# Patient Record
Sex: MALE | Race: WHITE | Employment: OTHER | URBAN - METROPOLITAN AREA
[De-identification: names, ages, dates, MRNs, and addresses within clinical notes are randomized per-mention and may not be internally consistent; named-entity substitution may affect disease eponyms.]

---

## 2019-01-08 ENCOUNTER — ANCILLARY PROCEDURE (OUTPATIENT)
Dept: GENERAL RADIOLOGY | Facility: CLINIC | Age: 59
End: 2019-01-08
Payer: COMMERCIAL

## 2019-01-08 ENCOUNTER — OFFICE VISIT (OUTPATIENT)
Dept: ORTHOPEDICS | Facility: CLINIC | Age: 59
End: 2019-01-08
Payer: COMMERCIAL

## 2019-01-08 VITALS
WEIGHT: 244 LBS | HEIGHT: 70 IN | DIASTOLIC BLOOD PRESSURE: 76 MMHG | BODY MASS INDEX: 34.93 KG/M2 | SYSTOLIC BLOOD PRESSURE: 124 MMHG

## 2019-01-08 DIAGNOSIS — M75.101 ROTATOR CUFF SYNDROME OF RIGHT SHOULDER: ICD-10-CM

## 2019-01-08 DIAGNOSIS — M25.511 RIGHT SHOULDER PAIN, UNSPECIFIED CHRONICITY: Primary | ICD-10-CM

## 2019-01-08 DIAGNOSIS — M25.511 RIGHT SHOULDER PAIN, UNSPECIFIED CHRONICITY: ICD-10-CM

## 2019-01-08 PROCEDURE — 99203 OFFICE O/P NEW LOW 30 MIN: CPT | Performed by: PEDIATRICS

## 2019-01-08 PROCEDURE — 73030 X-RAY EXAM OF SHOULDER: CPT | Mod: RT

## 2019-01-08 ASSESSMENT — MIFFLIN-ST. JEOR: SCORE: 1928.03

## 2019-01-08 NOTE — LETTER
1/8/2019         RE: Adriano Sparks  [address]  [place] [address]        Dear Colleague,    Thank you for referring your patient, Adriano Sparks, to the [facility] [place]. Please see a copy of my visit note below.    Sports Medicine Clinic Visit    PCP: No Ref-Primary, Physician    Adriano Sparks is a 58 year old male who is seen  as a self referral presenting with right shoulder and upper arm pain.  Pain has been present for about 18 months.  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    Does have a TENS unit and has tried this with ice.    Patient is right hand dominant.     Injury: overuse   Used to swim when younger. Noted difficulty with doing crawl stroke due to pain, tried other strokes. Eventually had to stop swimming at gym due to shoulder and arm pain.    Noted reduced strength in right hand, though now improving somewhat.    Pain in right upper arm, just distal to shoulder. Not as bad as has been in past, using icing, also TENS; both help.   Still pain with certain motions or reaching too quickly.    Location of Pain: right shoulder and upper arm   Duration of Pain: 18 month(s)  Rating of Pain at worst: 8/10  Rating of Pain Currently: 2/10  Symptoms are better with: TENS unit   Symptoms are worse with: overhead motions, pulling, lifting, IR, reaching   Additional Features:   Positive: weakness   Negative: swelling, bruising, popping, grinding, catching, locking, instability, paresthesias, numbness, pain in other joints and systemic symptoms  Other evaluation and/or treatments so far consists of: denies   Prior History of related problems: HX of shoulder injury about 40 years ago, HX of trigger finger in [facility].     Social History: interior restoration specialist    Review of Systems  Musculoskeletal: as above  Remainder of review of systems is negative including constitutional, CV, pulmonary, GI, Skin and  "Neurologic except as noted in HPI or medical history.      History reviewed. No pertinent past medical history.  Past Surgical History:   Procedure Laterality Date     LASIK  2006     Family History   Problem Relation Age of Onset     Coronary Artery Disease Father      Social History     Socioeconomic History     Marital status:      Spouse name: Not on file     Number of children: Not on file     Years of education: Not on file     Highest education level: Not on file   Social Needs     Financial resource strain: Not on file     Food insecurity - worry: Not on file     Food insecurity - inability: Not on file     Transportation needs - medical: Not on file     Transportation needs - non-medical: Not on file   Occupational History     Not on file   Tobacco Use     Smoking status: Never Smoker     Smokeless tobacco: Never Used   Substance and Sexual Activity     Alcohol use: Not on file     Drug use: Not on file     Sexual activity: Not on file   Other Topics Concern     Not on file   Social History Narrative     Not on file       Objective  /76   Ht 1.77 m (5' 9.69\")   Wt 110.7 kg (244 lb)   BMI 35.33 kg/m         GENERAL APPEARANCE: healthy, alert and no distress   GAIT: NORMAL  SKIN: no suspicious lesions or rashes  NEURO: Normal strength and tone, mentation intact and speech normal  PSYCH:  mentation appears normal and affect normal/bright  HEENT: no scleral icterus  CV: no extremity edema  RESP: nonlabored breathing    Right Shoulder exam    ROM:        forward flexion ~90        abduction ~90       internal rotation belt posteriorly       external rotation lacks ~10 deg compared to right  AROM above  Passive exceeds active    Tender:        None focal    Non Tender:       acromioclavicular joint       subacromial space       posterior shoulder    Strength:        abduction 5/5       internal rotation 5/5       external rotation 4+/5  Grossly symmetric  No significant change in pain with " testing    Impingement testing:        positive (+) Neer       Mild pain with Tinsley       Pain with empty can       neg (-) crossover    Skin:       no visible deformities       well perfused       capillary refill brisk    Sensation:        normal sensation over shoulder and upper extremity       Radiology  Visualized radiographs of right shoulder obtained today, and reviewed the images with the patient.  Impression: some AC joint arthrosis.    RIGHT SHOULDER THREE OR MORE VIEWS  1/8/2019 2:55 PM      HISTORY:  Right shoulder pain, unspecified chronicity.     COMPARISON: None.                                                                      IMPRESSION: Moderate hypertrophic degenerative change at the  acromioclavicular joint. No acute bony abnormality. Distal acromial  morphology is type II and humeral acromial distance appears adequate.     GEORGINA STOREY MD    Assessment:  1. Right shoulder pain, unspecified chronicity    2. Rotator cuff syndrome of right shoulder        Plan:  Discussed the assessment with the patient.  We discussed the following: symptom treatment, activity modification/rest, imaging, rehab and injection therapy. Following discussion, plan:  Topical Treatments: Ice or Heat prn  Over the counter medication: Patient's preferred OTC medication prn  Plain films of the shoulder reviewed. We discussed potential for additional imaging; hold in favor of rehab first.  Activity Modification: discussed   Rehab: Physical Therapy: PT referral next.  Discussed consideration of subacromial steroid injection for pain relief. He prefers rehab to start.  Follow up: if not improving with therapy, or as needed.  Questions answered. The patient indicates understanding of these issues and agrees with the plan.    Arnold Portillo DO, CAQ        Disclaimer: This note consists of symbols derived from keyboarding, dictation and/or voice recognition software. As a result, there may be errors in the script that  have gone undetected. Please consider this when interpreting information found in this chart.        Again, thank you for allowing me to participate in the care of your patient.        Sincerely,        Arnold Portillo, DO

## 2019-01-08 NOTE — PROGRESS NOTES
Sports Medicine Clinic Visit    PCP: No Ref-Primary, Physician    Adriano Clare is a 58 year old male who is seen  as a self referral presenting with right shoulder and upper arm pain.  Pain has been present for about 18 months.  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    Does have a TENS unit and has tried this with ice.    Patient is right hand dominant.     Injury: overuse   Used to swim when younger. Noted difficulty with doing crawl stroke due to pain, tried other strokes. Eventually had to stop swimming at gym due to shoulder and arm pain.    Noted reduced strength in right hand, though now improving somewhat.    Pain in right upper arm, just distal to shoulder. Not as bad as has been in past, using icing, also TENS; both help.   Still pain with certain motions or reaching too quickly.    Location of Pain: right shoulder and upper arm   Duration of Pain: 18 month(s)  Rating of Pain at worst: 8/10  Rating of Pain Currently: 2/10  Symptoms are better with: TENS unit   Symptoms are worse with: overhead motions, pulling, lifting, IR, reaching   Additional Features:   Positive: weakness   Negative: swelling, bruising, popping, grinding, catching, locking, instability, paresthesias, numbness, pain in other joints and systemic symptoms  Other evaluation and/or treatments so far consists of: denies   Prior History of related problems: HX of shoulder injury about 40 years ago, HX of trigger finger in [facility].     Social History: interior restoration specialist    Review of Systems  Musculoskeletal: as above  Remainder of review of systems is negative including constitutional, CV, pulmonary, GI, Skin and Neurologic except as noted in HPI or medical history.      History reviewed. No pertinent past medical history.  Past Surgical History:   Procedure Laterality Date     LASIK  2006     Family History   Problem Relation Age of Onset     Coronary Artery Disease  "Father      Social History     Socioeconomic History     Marital status:      Spouse name: Not on file     Number of children: Not on file     Years of education: Not on file     Highest education level: Not on file   Social Needs     Financial resource strain: Not on file     Food insecurity - worry: Not on file     Food insecurity - inability: Not on file     Transportation needs - medical: Not on file     Transportation needs - non-medical: Not on file   Occupational History     Not on file   Tobacco Use     Smoking status: Never Smoker     Smokeless tobacco: Never Used   Substance and Sexual Activity     Alcohol use: Not on file     Drug use: Not on file     Sexual activity: Not on file   Other Topics Concern     Not on file   Social History Narrative     Not on file       Objective  /76   Ht 1.77 m (5' 9.69\")   Wt 110.7 kg (244 lb)   BMI 35.33 kg/m        GENERAL APPEARANCE: healthy, alert and no distress   GAIT: NORMAL  SKIN: no suspicious lesions or rashes  NEURO: Normal strength and tone, mentation intact and speech normal  PSYCH:  mentation appears normal and affect normal/bright  HEENT: no scleral icterus  CV: no extremity edema  RESP: nonlabored breathing    Right Shoulder exam    ROM:        forward flexion ~90        abduction ~90       internal rotation belt posteriorly       external rotation lacks ~10 deg compared to right  AROM above  Passive exceeds active    Tender:        None focal    Non Tender:       acromioclavicular joint       subacromial space       posterior shoulder    Strength:        abduction 5/5       internal rotation 5/5       external rotation 4+/5  Grossly symmetric  No significant change in pain with testing    Impingement testing:        positive (+) Neer       Mild pain with Tinsley       Pain with empty can       neg (-) crossover    Skin:       no visible deformities       well perfused       capillary refill brisk    Sensation:        normal sensation over " shoulder and upper extremity       Radiology  Visualized radiographs of right shoulder obtained today, and reviewed the images with the patient.  Impression: some AC joint arthrosis.    RIGHT SHOULDER THREE OR MORE VIEWS  1/8/2019 2:55 PM      HISTORY:  Right shoulder pain, unspecified chronicity.     COMPARISON: None.                                                                      IMPRESSION: Moderate hypertrophic degenerative change at the  acromioclavicular joint. No acute bony abnormality. Distal acromial  morphology is type II and humeral acromial distance appears adequate.     GEORGINA STOREY MD    Assessment:  1. Right shoulder pain, unspecified chronicity    2. Rotator cuff syndrome of right shoulder        Plan:  Discussed the assessment with the patient.  We discussed the following: symptom treatment, activity modification/rest, imaging, rehab and injection therapy. Following discussion, plan:  Topical Treatments: Ice or Heat prn  Over the counter medication: Patient's preferred OTC medication prn  Plain films of the shoulder reviewed. We discussed potential for additional imaging; hold in favor of rehab first.  Activity Modification: discussed   Rehab: Physical Therapy: PT referral next.  Discussed consideration of subacromial steroid injection for pain relief. He prefers rehab to start.  Follow up: if not improving with therapy, or as needed.  Questions answered. The patient indicates understanding of these issues and agrees with the plan.    Arnold Portillo, , CAQ        Disclaimer: This note consists of symbols derived from keyboarding, dictation and/or voice recognition software. As a result, there may be errors in the script that have gone undetected. Please consider this when interpreting information found in this chart.

## 2019-01-12 ENCOUNTER — THERAPY VISIT (OUTPATIENT)
Dept: PHYSICAL THERAPY | Facility: CLINIC | Age: 59
End: 2019-01-12
Payer: COMMERCIAL

## 2019-01-12 DIAGNOSIS — M25.511 RIGHT SHOULDER PAIN, UNSPECIFIED CHRONICITY: ICD-10-CM

## 2019-01-12 DIAGNOSIS — M75.101 ROTATOR CUFF SYNDROME OF RIGHT SHOULDER: ICD-10-CM

## 2019-01-12 PROCEDURE — 97161 PT EVAL LOW COMPLEX 20 MIN: CPT | Mod: GP | Performed by: PHYSICAL THERAPIST

## 2019-01-12 PROCEDURE — 97110 THERAPEUTIC EXERCISES: CPT | Mod: GP | Performed by: PHYSICAL THERAPIST

## 2019-01-12 NOTE — PROGRESS NOTES
[facility] Initial Evaluation  Subjective:  The history is provided by the patient. No  was used.   Adriano Sparks is a 58 year old male with a right shoulder condition.  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  Condition occurred: at home.  This is a chronic condition  7-12-17 (about 18 months ago).    Patient reports pain:  Anterior, lateral and upper arm.    Pain is described as aching and sharp and is constant and reported as 4/10.   Pain is the same all the time.  Symptoms are exacerbated by carrying, using arm at shoulder level, certain positions and lifting (work duties) and relieved by rest.  Since onset symptoms are gradually worsening.        General health as reported by patient is good.  Pertinent medical history includes:  None.  Medical allergies: no.  Other surgeries include:  None reported.  Current medications:  None as reported by the patient.  Current occupation is Interior auto restoration  .  Patient is working in normal job without restrictions.  Primary job tasks include:  Repetitive tasks.    Barriers include:  None as reported by the patient.    Red flags:  None as reported by the patient.                        Objective:  Standing Alignment:    Cervical/Thoracic:  Forward head and thoracic kyphosis increased  Shoulder/UE:  Rounded shoulders, protracted scapula R and protracted scapula L                  Flexibility/Screens:   Negative screens: Cervical       Spine:  Decreased left spine flexibility:  Upper Trap and Levator    Decreased right spine flexibility:  Upper Trap and Levator                       Shoulder Evaluation:  ROM:  AROM:    Flexion:  Right:  90  Extension: Right: 50  Abduction:  Right:  60      External Rotation:  Right:  45      Elbow Flexion:  Right:  WNL  Elbow Extension:  Right:  Near full  extension  Flexion/External Rotation:  Right:  C3  Extension/Internal Rotation:  Right:  R glutes    PROM:    Flexion:  Right: 100      Abduction:  Right:  80      External Rotation:  Right:  60                        Special Tests:      Right shoulder positive for the following special tests:Impingement  Palpation:      Right shoulder tenderness present at: Levator; Upper Trap and Bicipital Groove  Mobility Tests:      Glenohumeral posterior right:  Hypomobile                                             General     ROS    Assessment/Plan:    Patient is a 58 year old male with right side shoulder complaints.    Patient has the following significant findings with corresponding treatment plan.                Diagnosis 1:  R shoulder pain  Pain -  hot/cold therapy, manual therapy, self management, education and home program  Decreased ROM/flexibility - manual therapy, therapeutic exercise, therapeutic activity and home program  Decreased strength - therapeutic exercise, therapeutic activities and home program  Impaired muscle performance - neuro re-education and home program  Decreased function - therapeutic activities and home program  Impaired posture - neuro re-education, therapeutic activities and home program    Therapy Evaluation Codes:   1) History comprised of:   Personal factors that impact the plan of care:      Time since onset of symptoms.    Comorbidity factors that impact the plan of care are:      None.     Medications impacting care: None.  2) Examination of Body Systems comprised of:   Body structures and functions that impact the plan of care:      Shoulder.   Activity limitations that impact the plan of care are:      Dressing, Lifting, Working and Sleeping.  3) Clinical presentation characteristics are:   Stable/Uncomplicated.  4) Decision-Making    Low complexity using standardized patient assessment instrument and/or measureable assessment of functional outcome.  Cumulative Therapy Evaluation is:  Low complexity.    Previous and current functional limitations:  (See Goal Flow Sheet for this information)    Short term and Long term goals: (See Goal Flow Sheet for this information)     Communication ability:  Patient appears to be able to clearly communicate and understand verbal and written communication and follow directions correctly.  Treatment Explanation - The following has been discussed with the patient:   RX ordered/plan of care  Anticipated outcomes  Possible risks and side effects  This patient would benefit from PT intervention to resume normal activities.   Rehab potential is good.    Frequency:  1 X week, once daily  Duration:  for 6 weeks tapering to 2 X a month over 2 months   Discharge Plan:  Achieve all LTG.  Independent in home treatment program.  Reach maximal therapeutic benefit.    Please refer to the daily flowsheet for treatment today, total treatment time and time spent performing 1:1 timed codes.

## 2019-01-18 ENCOUNTER — THERAPY VISIT (OUTPATIENT)
Dept: PHYSICAL THERAPY | Facility: CLINIC | Age: 59
End: 2019-01-18
Payer: COMMERCIAL

## 2019-01-18 DIAGNOSIS — G89.29 CHRONIC RIGHT SHOULDER PAIN: Primary | ICD-10-CM

## 2019-01-18 DIAGNOSIS — M25.511 CHRONIC RIGHT SHOULDER PAIN: Primary | ICD-10-CM

## 2019-01-18 PROCEDURE — 97110 THERAPEUTIC EXERCISES: CPT | Mod: GP | Performed by: PHYSICAL THERAPY ASSISTANT

## 2019-01-25 ENCOUNTER — THERAPY VISIT (OUTPATIENT)
Dept: PHYSICAL THERAPY | Facility: CLINIC | Age: 59
End: 2019-01-25
Payer: COMMERCIAL

## 2019-01-25 DIAGNOSIS — M25.511 CHRONIC RIGHT SHOULDER PAIN: Primary | ICD-10-CM

## 2019-01-25 DIAGNOSIS — G89.29 CHRONIC RIGHT SHOULDER PAIN: Primary | ICD-10-CM

## 2019-01-25 PROCEDURE — 97110 THERAPEUTIC EXERCISES: CPT | Mod: GP | Performed by: PHYSICAL THERAPY ASSISTANT

## 2019-01-25 PROCEDURE — 97140 MANUAL THERAPY 1/> REGIONS: CPT | Mod: GP | Performed by: PHYSICAL THERAPY ASSISTANT

## 2019-02-01 ENCOUNTER — THERAPY VISIT (OUTPATIENT)
Dept: PHYSICAL THERAPY | Facility: CLINIC | Age: 59
End: 2019-02-01
Payer: COMMERCIAL

## 2019-02-01 DIAGNOSIS — G89.29 CHRONIC RIGHT SHOULDER PAIN: Primary | ICD-10-CM

## 2019-02-01 DIAGNOSIS — M25.511 CHRONIC RIGHT SHOULDER PAIN: Primary | ICD-10-CM

## 2019-02-01 PROCEDURE — 97140 MANUAL THERAPY 1/> REGIONS: CPT | Mod: GP | Performed by: PHYSICAL THERAPY ASSISTANT

## 2019-02-01 PROCEDURE — 97110 THERAPEUTIC EXERCISES: CPT | Mod: GP | Performed by: PHYSICAL THERAPY ASSISTANT

## 2022-05-17 NOTE — PROGRESS NOTES
Goal Outcome Evaluation:              Outcome Evaluation: Pt resting in bed throughout shift.  Pt is possible dc home w/SO.   Subjective:  HPI                    Objective:  System    Physical Exam    General     ROS    Assessment/Plan:    DISCHARGE REPORT    Progress reporting period is from 01/12/2019 to 04/02/2019.   Patient has not returned to therapy so current subjective and objective findings are unknown.  The subjective and objective information are from the last visit (02/01/2019) with this patient.    SUBJECTIVE  Subjective: Pt reports no major changes in the right shoulder.  Pulled his  cord and the rope broke and the arm was sore for 45 minutes.    Current Pain level: 3/10   Initial Pain level: 4/10   Changes in function: No changes noted in function since last SOAP note     OBJECTIVE  Objective: PROM: right shoulder flexion 142, abduction 125, ER 40, IR 45.       ASSESSMENT/PLAN  Updated problem list and treatment plan: home program  STG/LTGs have been met or progress has been made towards goals:  N/A  Assessment of Progress: The patient has not returned to therapy. Current status is unknown.  Self Management Plans:  Patient has been instructed in a home treatment program.  Adriano continues to require the following intervention to meet STG and LTG's: PT intervention is no longer required to meet STG/LTG.    Recommendations:  Pt last seen in PT 02/01/2019.  He did not schedule f/u beyond that and has not responded to phone message left for him to check on status.  Discharge to [facility].